# Patient Record
Sex: MALE | Race: WHITE
[De-identification: names, ages, dates, MRNs, and addresses within clinical notes are randomized per-mention and may not be internally consistent; named-entity substitution may affect disease eponyms.]

---

## 2018-08-14 ENCOUNTER — HOSPITAL ENCOUNTER (EMERGENCY)
Dept: HOSPITAL 96 - M.ERS | Age: 23
Discharge: HOME | End: 2018-08-14
Payer: MEDICAID

## 2018-08-14 VITALS — DIASTOLIC BLOOD PRESSURE: 74 MMHG | SYSTOLIC BLOOD PRESSURE: 118 MMHG

## 2018-08-14 VITALS — HEIGHT: 70 IN | BODY MASS INDEX: 22.91 KG/M2 | WEIGHT: 160.01 LBS

## 2018-08-14 DIAGNOSIS — R06.02: ICD-10-CM

## 2018-08-14 DIAGNOSIS — R11.2: ICD-10-CM

## 2018-08-14 DIAGNOSIS — Z88.8: ICD-10-CM

## 2018-08-14 DIAGNOSIS — R07.9: Primary | ICD-10-CM

## 2018-08-14 LAB
ABSOLUTE BASOPHILS: 0.1 THOU/UL (ref 0–0.2)
ABSOLUTE EOSINOPHILS: 0.2 THOU/UL (ref 0–0.7)
ABSOLUTE MONOCYTES: 0.4 THOU/UL (ref 0–1.2)
ALBUMIN SERPL-MCNC: 4.4 G/DL (ref 3.4–5)
ALP SERPL-CCNC: 114 U/L (ref 46–116)
ALT SERPL-CCNC: 31 U/L (ref 30–65)
ANION GAP SERPL CALC-SCNC: 8 MMOL/L (ref 7–16)
AST SERPL-CCNC: 21 U/L (ref 15–37)
BASOPHILS NFR BLD AUTO: 1.1 %
BILIRUB SERPL-MCNC: 0.3 MG/DL
BUN SERPL-MCNC: 12 MG/DL (ref 7–18)
CALCIUM SERPL-MCNC: 8.5 MG/DL (ref 8.5–10.1)
CHLORIDE SERPL-SCNC: 104 MMOL/L (ref 98–107)
CO2 SERPL-SCNC: 28 MMOL/L (ref 21–32)
CREAT SERPL-MCNC: 0.9 MG/DL (ref 0.6–1.3)
EOSINOPHIL NFR BLD: 4.3 %
GLUCOSE SERPL-MCNC: 94 MG/DL (ref 70–99)
GRANULOCYTES NFR BLD MANUAL: 47.7 %
HCT VFR BLD CALC: 44.7 % (ref 42–52)
HGB BLD-MCNC: 15.2 GM/DL (ref 14–18)
LIPASE: 64 U/L (ref 73–393)
LYMPHOCYTES # BLD: 1.8 THOU/UL (ref 0.8–5.3)
LYMPHOCYTES NFR BLD AUTO: 38.2 %
MCH RBC QN AUTO: 30.6 PG (ref 26–34)
MCHC RBC AUTO-ENTMCNC: 34 G/DL (ref 28–37)
MCV RBC: 89.9 FL (ref 80–100)
MONOCYTES NFR BLD: 8.7 %
MPV: 9.2 FL. (ref 7.2–11.1)
NEUTROPHILS # BLD: 2.3 THOU/UL (ref 1.6–8.1)
NUCLEATED RBCS: 0 /100WBC
PLATELET COUNT*: 227 THOU/UL (ref 150–400)
POTASSIUM SERPL-SCNC: 3.9 MMOL/L (ref 3.5–5.1)
PROT SERPL-MCNC: 7.1 G/DL (ref 6.4–8.2)
RBC # BLD AUTO: 4.97 MIL/UL (ref 4.5–6)
RDW-CV: 12.7 % (ref 10.5–14.5)
SODIUM SERPL-SCNC: 140 MMOL/L (ref 136–145)
WBC # BLD AUTO: 4.8 THOU/UL (ref 4–11)

## 2018-08-15 NOTE — EKG
[place] [id number]
Phone:  [phone]                     ELECTROCARDIOGRAM REPORT      
_______________________________________________________________________________
 
Name:       NAZANIN DORSEY              Room:                      [facility]#:  [id number]      Account #:      [id number]  
Admission:  18     Attend Phys:                         
Discharge:  18     Date of Birth:  [birth year]  
         Report #: [id number]
    [id number]
_______________________________________________________________________________
THIS REPORT FOR:  //name//                      
 
                         [facility] ED
                                       
Test Date:    2018               Test Time:    17:30:48
Pat Name:     NAZANIN DORSEY          Department:   
Patient ID:   [id number]            Room:          
Gender:       M                        Technician:   
:          1995               Requested By: Francesco Cross
Order Number: [id number] MD:   Ghulam Dao
                                 Measurements
Intervals                              Axis          
Rate:         70                       P:            35
NM:           151                      QRS:          33
QRSD:         100                      T:            31
QT:           376                                    
QTc:          406                                    
                           Interpretive Statements
Sinus rhythm
Compared to ECG 2015 03:08:51
First degree AV block no longer present
 
 
Electronically Signed On 8- 12:00:59 CDT by Ghulam Dao
[email or web address]
 
 
 
 
 
 
 
 
 
 
 
 
 
 
 
 
 
 
  <ELECTRONICALLY SIGNED>
                                           By: Ghulam Dao MD, [facility]      
  08/15/18     1200
D: 18 1730   _____________________________________
T: 18 1730   Ghulam Dao MD, FACC        /EPI